# Patient Record
Sex: FEMALE | Race: BLACK OR AFRICAN AMERICAN | NOT HISPANIC OR LATINO | Employment: OTHER | URBAN - METROPOLITAN AREA
[De-identification: names, ages, dates, MRNs, and addresses within clinical notes are randomized per-mention and may not be internally consistent; named-entity substitution may affect disease eponyms.]

---

## 2020-11-11 ENCOUNTER — TRANSCRIBE ORDERS (OUTPATIENT)
Dept: ADMINISTRATIVE | Facility: HOSPITAL | Age: 55
End: 2020-11-11

## 2020-11-11 DIAGNOSIS — Z12.31 VISIT FOR SCREENING MAMMOGRAM: Primary | ICD-10-CM

## 2022-04-11 ENCOUNTER — TRANSCRIBE ORDERS (OUTPATIENT)
Dept: ADMINISTRATIVE | Facility: HOSPITAL | Age: 57
End: 2022-04-11

## 2022-04-11 DIAGNOSIS — Z12.31 VISIT FOR SCREENING MAMMOGRAM: Primary | ICD-10-CM

## 2022-05-05 ENCOUNTER — HOSPITAL ENCOUNTER (OUTPATIENT)
Dept: MAMMOGRAPHY | Facility: HOSPITAL | Age: 57
Discharge: HOME | End: 2022-05-05
Admitting: FAMILY MEDICINE

## 2022-05-05 DIAGNOSIS — Z12.31 VISIT FOR SCREENING MAMMOGRAM: ICD-10-CM

## 2022-05-05 PROCEDURE — 77063 BREAST TOMOSYNTHESIS BI: CPT

## 2022-05-05 PROCEDURE — 77063 BREAST TOMOSYNTHESIS BI: CPT | Performed by: RADIOLOGY

## 2022-05-05 PROCEDURE — 77067 SCR MAMMO BI INCL CAD: CPT | Performed by: RADIOLOGY

## 2022-05-05 PROCEDURE — 77067 SCR MAMMO BI INCL CAD: CPT

## 2023-03-28 ENCOUNTER — TRANSCRIBE ORDERS (OUTPATIENT)
Dept: ADMINISTRATIVE | Facility: HOSPITAL | Age: 58
End: 2023-03-28
Payer: COMMERCIAL

## 2023-03-28 DIAGNOSIS — Z12.31 ENCOUNTER FOR SCREENING MAMMOGRAM FOR BREAST CANCER: Primary | ICD-10-CM

## 2023-05-17 ENCOUNTER — HOSPITAL ENCOUNTER (OUTPATIENT)
Dept: MAMMOGRAPHY | Facility: HOSPITAL | Age: 58
Discharge: HOME | End: 2023-05-17
Admitting: OBSTETRICS & GYNECOLOGY
Payer: COMMERCIAL

## 2023-05-17 DIAGNOSIS — Z12.31 ENCOUNTER FOR SCREENING MAMMOGRAM FOR BREAST CANCER: ICD-10-CM

## 2023-05-17 PROCEDURE — 77063 BREAST TOMOSYNTHESIS BI: CPT

## 2023-05-17 PROCEDURE — 77067 SCR MAMMO BI INCL CAD: CPT

## 2023-12-27 ENCOUNTER — OFFICE VISIT (OUTPATIENT)
Dept: GYNECOLOGIC ONCOLOGY | Facility: CLINIC | Age: 58
End: 2023-12-27
Payer: COMMERCIAL

## 2023-12-27 VITALS
BODY MASS INDEX: 46.83 KG/M2 | DIASTOLIC BLOOD PRESSURE: 84 MMHG | OXYGEN SATURATION: 98 % | WEIGHT: 254.5 LBS | HEIGHT: 62 IN | HEART RATE: 112 BPM | TEMPERATURE: 97.7 F | SYSTOLIC BLOOD PRESSURE: 180 MMHG | RESPIRATION RATE: 18 BRPM

## 2023-12-27 DIAGNOSIS — E66.01 CLASS 3 SEVERE OBESITY DUE TO EXCESS CALORIES WITHOUT SERIOUS COMORBIDITY WITH BODY MASS INDEX (BMI) OF 45.0 TO 49.9 IN ADULT: ICD-10-CM

## 2023-12-27 DIAGNOSIS — Z78.9 NO BLOOD PRODUCTS: ICD-10-CM

## 2023-12-27 DIAGNOSIS — N85.02 COMPLEX ATYPICAL ENDOMETRIAL HYPERPLASIA: Primary | ICD-10-CM

## 2023-12-27 RX ORDER — CETIRIZINE HYDROCHLORIDE 10 MG/1
10 TABLET, CHEWABLE ORAL DAILY
COMMUNITY

## 2023-12-27 RX ORDER — FLUCONAZOLE 150 MG/1
150 TABLET ORAL
COMMUNITY
Start: 2023-12-15

## 2023-12-27 NOTE — PROGRESS NOTES
Ana PrietoJenny Rafael  [id number]  1965      Reason for visit:  2nd opinion, endometrial hyperplasia    Consultation:  Patient is being seen at the request of Rogelio Vo and David    History of present illness:  The patient is a 58 y.o. year old female who presents today for 2nd opinion of the above issues.    She was seeing her OB GYN for perimenopausal symptoms and evaluation. Due to intermenstrual spotting, she underwent TVUS in  and  and EMB in . EMB  demonstrated atypical endometrial hyperplasia. TVUS demonstrated endometrial thickness of 13 mm in  and 8 mm in . Since last year she has been making dietary and lifestyle changes, and would like a second opinion regarding treatment and management recommendations.     Her primary OB recommended hysterectomy vs D&C with IUD, and recommended she start Megace. She has not started this medication, and has made dietary changes and increased her exercise.     For new patients, PFSH intake form from  was reviewed and confirmed.    OBGYN History:  She is a .  She does not use HRT. She does not have a history of abnormal pap smears.    Oncologic History:  Oncology/Hematology History    No history exists.         History reviewed. No pertinent past medical history.    Past Surgical History:   Procedure Laterality Date    ANKLE ARTHROSCOPY W/ OPEN REPAIR  1995    BASAL CELL CARCINOMA EXCISION  2017    scalp    TONSILLECTOMY         MEDICATIONS:    Current Outpatient Medications:     cetirizine (ZyrTEC) 10 MG chewable tablet, Chew 1 tablet Daily., Disp: , Rfl:     fluconazole (DIFLUCAN) 150 MG tablet, Take 1 tablet by mouth Every 3 (Three) Days., Disp: , Rfl:      Allergies:  is allergic to amoxicillin-pot clavulanate, erythromycin, and zinc.    Social History:   Social History     Socioeconomic History    Marital status:        Family History:    Family History   Problem Relation Age of Onset    Leukemia Father      "Prostate cancer Father     Hashimoto's thyroiditis Sister     Breast cancer Neg Hx     Ovarian cancer Neg Hx        Health Maintenance:    Health Maintenance   Topic Date Due    BMI FOLLOWUP  Never done    COLORECTAL CANCER SCREENING  Never done    TDAP/TD VACCINES (1 - Tdap) Never done    ZOSTER VACCINE (1 of 2) Never done    INFLUENZA VACCINE  Never done    ANNUAL PHYSICAL  Never done    PAP SMEAR  Never done    MAMMOGRAM  05/17/2025    HEPATITIS C SCREENING  Completed    COVID-19 Vaccine  Completed    Pneumococcal Vaccine 0-64  Aged Out       Review of Systems:  Please refer to history of present illness.  Review of systems otherwise negative.    Physical Exam:  Vitals:    12/27/23 1416   BP: 180/84   Pulse: 112   Resp: 18   Temp: 97.7 °F (36.5 °C)   TempSrc: Temporal   SpO2: 98%   Weight: 115 kg (254 lb 8 oz)   Height: 157.5 cm (62\")   PainSc: 0-No pain     Body mass index is 46.55 kg/m².  Wt Readings from Last 3 Encounters:   12/27/23 115 kg (254 lb 8 oz)     GENERAL: Alert, well-appearing female appearing her stated age who is in no apparent distress.   HEENT: Sclera anicteric. Head normocephalic, atraumatic. Mucus membranes moist.   NECK: Trachea midline, supple, without masses.  No thyromegaly.   BREASTS: Deferred  CARDIOVASCULAR: Normal rate, regular rhythm, no murmurs, rubs, or gallops.  No peripheral edema.  RESPIRATORY: Clear to auscultation bilaterally, normal respiratory effort  BACK:  No CVA tenderness, no vertebral tenderness on palpation  GASTROINTESTINAL: Abdomen is soft, nontender, nondistended.  Obese.  Hernia at umbilicus and ventral hernia.  No palpable mass.  SKIN:  Warm, dry, well-perfused.  All visible areas intact.  No rashes, lesions, ulcers.  PSYCHIATRIC: AO x3, with appropriate affect, normal thought processes.  NEUROLOGIC: No focal deficits.  Moves extremities well.  MUSCULOSKELETAL: Normal gait and station.   EXTREMITIES:   No cyanosis, clubbing, symmetric.  LYMPHATICS:  No cervical or " "inguinal adenopathy noted.     PELVIC exam: Patient respectfully declined    ECOG PS 0    PROCEDURES:  none    Diagnostic Data:    No Images in the past 120 days found..    Lab Results   Component Value Date    WBC 7.70 11/21/2022    HGB 15.2 11/21/2022    HCT 47.9 (H) 11/21/2022    MCV 88.2 11/21/2022     11/21/2022    NEUTROABS 4.97 11/21/2022     No results found for: \"\"      Assessment & Plan   This is a 58 y.o. woman seeking second opinion for endometrial hyperplasia  Encounter Diagnoses   Name Primary?    Complex atypical endometrial hyperplasia Yes    No blood products     Class 3 severe obesity due to excess calories without serious comorbidity with body mass index (BMI) of 45.0 to 49.9 in adult      Complex Endometrial Hyperplasia  Pap 12/2022 NILM  TVUS demonstrated endometrial thickness of 13 mm in 2022 and 8 mm in 2023  EMB 11/27 demonstrated atypical endometrial hyperplasia. Pathology report did not discern between simple or complex. Pathology lab contacted at [place] and slides were re-examined, over phone disclosed diagnosis of complex atypical endometrial hyperplasia.   Discussed at length that the recommendation for her at this point would be to proceed with hysterectomy, ideally laparoscopic.  We discussed that conservative management of complex endometrial hyperplasia would typically only be performed in someone who is not a good surgical candidate.  I have counseled her that the risk of concomitant endometrial cancer exceeds 40% and that surgery is the cornerstone of management. Given the increased risk of concurrent cancer, I recommended lymph node assessment via the sentinel node approach.  We discussed the importance of awaiting final pathology and also discussed the nature of endometrial cancer. She is aware that the majority of endometrial cancer cases are found in the early stages; however, if more advanced disease is encountered, she may require additional treatment. She was " also informed of the risks of the procedure including bleeding, infection, wound breakdown, blood clots, injury to surrounding organs requiring additional intervention, possible need for another procedure, and the need for a laparotomy if the surgery cannot be performed safely via the minimally invasive approach. We also discussed the anticipated hospital stay and recovery time. After all questions were answered, she has decided that she will discuss with her family and make a decision.  She is to call back if she would like to schedule surgery.  In addition, discussed that black woman is an increased risk for delayed diagnosis of endometrial cancer due to fibroid uterus and measurement of endometrial stripe.  Discussed that black women are at increased risk for presenting with a more advanced disease and poor outcomes when compared to white counterparts.  Encourage patient to proceed with surgical intervention.    No blood products  Patient declines blood transfusions as part of her Denominational beliefs.    Patient was consented for injection of ICG dye, robotic assisted total laparoscopic hysterectomy, bilateral salpingo-oophorectomy, sentinel/complete lymph node dissection    Risks and benefits of surgery were discussed.  This included, but was not limited to, infection and bleeding like when the skin is cut; damage to surrounding structures; and incisional complications.  Risk of DVT was addressed for major surgeries.  Standard of care efforts to minimize these risks were reviewed.  Typical hospital stay and recovery were discussed as well as post-procedure precautions.  Surgical implications of chronic illnesses on recovery and surgical outcome were reviewed.   Pain medication regimen for postoperative care was discussed.  Typical regimen and avoidance of narcotics was discussed.  Patient was educated that other factors, such as existing narcotic use, can impact postoperative pain management.    Risks and benefits  of lymph node dissection were further discussed.  This included lymphocyst, hematoma, lymphedema, vascular injury, and nerve injury.  Patient verbalized understanding of the plan including the risks and benefits.  Appropriate perioperative testing including laboratory evaluation, EKG as clinically indicated, chest x-ray as clinically indicated, and preadmission evaluation were all ordered as a part of this patient's care.    Obesity  Patient has already made lifestyle changes.  She was encouraged to continue this.  We discussed the relationship between type I endometrial cancers and obesity.  Patient verbalized understanding.    Pain assessment was performed today as a part of patient’s care.  For patients with pain related to surgery, gynecologic malignancy or cancer treatment, the plan is as noted in the assessment/plan.  For patients with pain not related to these issues, they are to seek any further needed care from a more appropriate provider, such as PCP.      No orders of the defined types were placed in this encounter.      FOLLOW UP: Patient to call if she desires to schedule surgery    Kelly Sinclair MD  Gynecologic Oncology Resident    I spent 60 minutes caring for Ana on this date of service. This time includes time spent by me in the following activities: preparing for the visit, reviewing tests, performing a medically appropriate examination and/or evaluation, counseling and educating the patient/family/caregiver, ordering medications, tests, or procedures, referring and communicating with other health care professionals, documenting information in the medical record, and care coordination      Patient was seen and examined with Dr. Sinclair,  resident, who performed portions of the examination and documentation for this patient's care under my direct supervision.  I agree with the above documentation and plan.    Lucrecia Jacome MD  12/28/23  10:31 EST

## 2023-12-28 PROBLEM — Z78.9 NO BLOOD PRODUCTS: Status: ACTIVE | Noted: 2023-12-28

## 2023-12-28 PROBLEM — E66.01 CLASS 3 SEVERE OBESITY DUE TO EXCESS CALORIES WITH BODY MASS INDEX (BMI) OF 45.0 TO 49.9 IN ADULT: Status: ACTIVE | Noted: 2023-12-28

## 2023-12-28 PROBLEM — N85.02 COMPLEX ATYPICAL ENDOMETRIAL HYPERPLASIA: Status: ACTIVE | Noted: 2023-12-28

## 2024-05-23 ENCOUNTER — TRANSCRIBE ORDERS (OUTPATIENT)
Dept: ADMINISTRATIVE | Facility: HOSPITAL | Age: 59
End: 2024-05-23
Payer: COMMERCIAL

## 2024-05-23 DIAGNOSIS — Z12.31 VISIT FOR SCREENING MAMMOGRAM: Primary | ICD-10-CM

## 2024-06-22 ENCOUNTER — HOSPITAL ENCOUNTER (OUTPATIENT)
Dept: MAMMOGRAPHY | Facility: HOSPITAL | Age: 59
Discharge: HOME | End: 2024-06-22
Admitting: STUDENT IN AN ORGANIZED HEALTH CARE EDUCATION/TRAINING PROGRAM
Payer: COMMERCIAL

## 2024-06-22 DIAGNOSIS — Z12.31 VISIT FOR SCREENING MAMMOGRAM: ICD-10-CM

## 2024-06-22 LAB
NCCN CRITERIA FLAG: ABNORMAL
TYRER CUZICK SCORE: 9.9

## 2024-06-22 PROCEDURE — 77067 SCR MAMMO BI INCL CAD: CPT

## 2024-06-22 PROCEDURE — 77063 BREAST TOMOSYNTHESIS BI: CPT

## 2024-07-11 ENCOUNTER — HOSPITAL ENCOUNTER (OUTPATIENT)
Dept: MAMMOGRAPHY | Facility: HOSPITAL | Age: 59
Discharge: HOME | End: 2024-07-11
Payer: COMMERCIAL

## 2024-07-11 ENCOUNTER — HOSPITAL ENCOUNTER (OUTPATIENT)
Dept: ULTRASOUND IMAGING | Facility: HOSPITAL | Age: 59
Discharge: HOME | End: 2024-07-11
Payer: COMMERCIAL

## 2024-07-11 DIAGNOSIS — R92.8 ABNORMAL MAMMOGRAM: ICD-10-CM

## 2024-07-11 PROCEDURE — 76642 ULTRASOUND BREAST LIMITED: CPT

## 2024-07-11 PROCEDURE — G0279 TOMOSYNTHESIS, MAMMO: HCPCS

## 2024-07-11 PROCEDURE — 77065 DX MAMMO INCL CAD UNI: CPT
